# Patient Record
(demographics unavailable — no encounter records)

---

## 2025-02-26 NOTE — DISCUSSION/SUMMARY
[FreeTextEntry1] : A - Dysuria  P- pt has taken a course of Augmentin and a course of Cipro without relief      Vaginitis panel and Urine C&S done      inspection of vulva and vagina reveals no lesions or abnormalities    will follow up for today's results.     advised drinking lots of fluids     ( pt got no relief from pyridium)

## 2025-02-26 NOTE — PHYSICAL EXAM
[Chaperone Present] : A chaperone was present in the examining room during all aspects of the physical examination [Labia Majora] : normal [Labia Minora] : normal [Normal] : normal [Uterine Adnexae] : normal [FreeTextEntry2] : Kelsey

## 2025-02-26 NOTE — HISTORY OF PRESENT ILLNESS
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  has history of cervical HPV.  pt states that pain also is at the urethral orifice.

## 2025-04-17 NOTE — HISTORY OF PRESENT ILLNESS
[FreeTextEntry1] : 30 y/o female here for annual exam. LMP 3/6 on OCPs h/o HRHPV typ 18/45 on papillex

## 2025-04-17 NOTE — COUNSELING
[Nutrition/ Exercise/ Weight Management] : nutrition, exercise, weight management [Body Image] : body image [Breast Self Exam] : breast self exam [Contraception/ Emergency Contraception/ Safe Sexual Practices] : contraception, emergency contraception, safe sexual practices [STD (testing, results, tx)] : STD (testing, results, tx) [FreeTextEntry2] :  patient screened for depression - no signs of clinical depression. PHQ-9 scores reviewed over the course of the visit. 5-10 minutes of face to face time. Follow up with changes in mood including other symptoms of anxiety.

## 2025-04-17 NOTE — PLAN
[FreeTextEntry1] : ARLENE is a 29 year old female presenting for well woman exam. Sexually active, monogamous with 1 male partner. No complaints. Reg menses.   HCM pap/hpv

## 2025-04-17 NOTE — PHYSICAL EXAM
[MA] : MA [Appropriately responsive] : appropriately responsive [Alert] : alert [No Acute Distress] : no acute distress [No Lymphadenopathy] : no lymphadenopathy [Regular Rate Rhythm] : regular rate rhythm [No Murmurs] : no murmurs [Clear to Auscultation B/L] : clear to auscultation bilaterally [Soft] : soft [Non-tender] : non-tender [Non-distended] : non-distended [No HSM] : No HSM [No Lesions] : no lesions [No Mass] : no mass [Oriented x3] : oriented x3 [Examination Of The Breasts] : a normal appearance [No Masses] : no breast masses were palpable [Labia Majora] : normal [Labia Minora] : normal [Normal] : normal [Uterine Adnexae] : normal

## 2025-05-14 NOTE — PROCEDURE
[Colposcopy] : Colposcopy  [Time out performed] : Pre-procedure time out performed.  Patient's name, date of birth and procedure confirmed. [Consent Obtained] : Consent obtained [Risks] : risks [Benefits] : benefits [Alternatives] : alternatives [Patient] : patient [Infection] : infection [Bleeding] : bleeding [Allergic Reaction] : allergic reaction [HPV High Risk] : HPV high risk [Colposcopy Adequate] : colposcopy adequate [SCI Fully Visualized] : SCI fully visualized [ECC Performed] : ECC performed [No Abnormalities] : no abnormalities [Lesion] : lesion seen [Biopsy] : biopsy taken [Hemostasis Obtained] : Hemostasis obtained [Tolerated Well] : the patient tolerated the procedure well [de-identified] : 2 [de-identified] : aceitowhite and lugol's non -staining from 6 oclock to 1 oclock [de-identified] : 7 and 12 oclock